# Patient Record
Sex: FEMALE | Race: WHITE
[De-identification: names, ages, dates, MRNs, and addresses within clinical notes are randomized per-mention and may not be internally consistent; named-entity substitution may affect disease eponyms.]

---
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
## 2018-06-01 ENCOUNTER — HOSPITAL ENCOUNTER (EMERGENCY)
Dept: HOSPITAL 25 - UCEAST | Age: 18
Discharge: HOME | End: 2018-06-01
Payer: COMMERCIAL

## 2018-06-01 VITALS — DIASTOLIC BLOOD PRESSURE: 64 MMHG | SYSTOLIC BLOOD PRESSURE: 111 MMHG

## 2018-06-01 DIAGNOSIS — J02.9: Primary | ICD-10-CM

## 2018-06-01 DIAGNOSIS — R09.81: ICD-10-CM

## 2018-06-01 PROCEDURE — G0463 HOSPITAL OUTPT CLINIC VISIT: HCPCS

## 2018-06-01 PROCEDURE — 87651 STREP A DNA AMP PROBE: CPT

## 2018-06-01 PROCEDURE — 99211 OFF/OP EST MAY X REQ PHY/QHP: CPT

## 2018-06-01 NOTE — UC
Yemi GARAY Elizabeth, scribed for Ken Vargas MD on 06/01/18 at 1520 .





Throat Pain/Nasal Reilly HPI





- HPI Summary


HPI Summary: 


This patient is a 18 year old F presenting to [facility] with a chief complaint of 

sore throat since 2 days ago. Symptoms aggravated by nothing. Symptoms 

alleviated by nothing. Patient reports post-nasal drip and nasal congestion. 

Patient denies cough, chest pain, fever, and chills.








- History of Current Complaint


Stated Complaint: SORE THROAT


Time Seen by Provider: 06/01/18 15:11


Hx Obtained From: Patient


Hx Last Menstrual Period: 11/6/17


Onset/Duration: Gradual Onset, Lasting Days - 2 days ago, Still Present


Severity: Mild


Cough: None


Associated Signs & Symptoms: Positive: Nasal Discharge.  Negative: Fever





- Allergies/Home Medications


Allergies/Adverse Reactions: 


 Allergies











Allergy/AdvReac Type Severity Reaction Status Date / Time


 


nickel Allergy  See Comment Verified 06/01/18 15:14














PMH/Surg Hx/FS Hx/Imm Hx


Previously Healthy: Yes





- Surgical History


Surgical History: None





- Family History


Known Family History: Positive: Other - hypothyroidism


   Negative: Cardiac Disease, Hypertension, Diabetes





- Social History


Alcohol Use: Rare


Substance Use Type: None


Smoking Status (MU): Never Smoked Tobacco





- Immunization History


Vaccination Up to Date: Yes





Review of Systems


Constitutional: Negative - NEGATIVE FEVER, NEGATIVE CHILLS


ENT: Sore Throat, Nasal Discharge, Sinus Congestion


Respiratory: Negative - NEGATIVE COUGH


Cardiovascular: Negative - NEGATIVE CHEST PAIN


All Other Systems Reviewed And Are Negative: Yes





Physical Exam





- Summary


Physical Exam Summary: 


VITAL SIGNS: Reviewed.


GENERAL: Patient is a well-developed and nourished FEMALE who is lying 

comfortable in the stretcher. Patient is not in any acute respiratory distress.


HEAD AND FACE: Normocephalic


EYES: PERRLA, EOMI x 2.


EARS: Hearing grossly intact.


MOUTH:. Slight erythematous pharynx


NECK: Supple, trachea is midline, no adenopathy, no JVD, no carotid bruit.


CHEST: Symmetric, no tenderness at palpation


LUNGS: Clear to auscultation bilaterally. No wheezing or crackles.


CVS: Regular rate and rhythm, S1 and S2 present, no murmurs or gallops 

appreciated.


ABDOMEN: Soft, non-tender. Bowel sounds are normal. No abdominal abnormal 

pulsations.


EXTREMITIES: Full ROM in all major joints, no edema, no cyanosis or clubbing.


NEURO: Alert and oriented x 3. No acute neurological deficits. Speech is normal 

and follows commands.


SKIN: Dry and warm





Triage Information Reviewed: Yes


Vital Signs: 


 Initial Vital Signs











Temp  98.6 F   06/01/18 15:11


 


Pulse  76   06/01/18 15:11


 


Resp  18   06/01/18 15:11


 


BP  111/64   06/01/18 15:11


 


Pulse Ox  97   06/01/18 15:11











Vital Signs Reviewed: Yes





Throat Pain/Nasal Course/Dx





- Course


Assessment/Plan: 18-year-old female with chief complaint of sore throat, runny 

nose.  Rapid strep is negative.  Likely the patient has pharyngitis with viral 

etiology.  Patient was discharged home with follow-up with PCP.  She will be 

taken ibuprofen or Tylenol for the pain.  I discussed all the findings and test 

results with the patient and Patient was instructed to return  to the  or go 

to the ED if  develops any fever, increase sore throat unable to swallow, 

drooling, unable to open their mouth, or any other symptoms. The patient 

understands and agrees.  Plan of care was discussed with the patient and 

understands and agrees. All questions were answered at patient satisfaction.  

There were no further complaints or concerns.  Patient is A + O X 3.  

hemodynamically stable.





- Differential Dx/Diagnosis


Differential Diagnosis/HQI/PQRI: Pharyngitis, Sinusitis, Tonsillitis, URI


Provider Diagnoses: Pharyngitis





Discharge





- Sign-Out/Discharge


Documenting (check all that apply): Discharge/Admit/Transfer





- Discharge Plan


Condition: Stable


Disposition: HOME


Patient Education Materials:  Pharyngitis (ED)


Referrals: 


Miya Chen NP [Primary Care Provider] - 


Additional Instructions: 


Take Tylenol or Ibuprofen for fever or pain. 


Increase your fluid intake


Return to the  if symptoms worsen











The documentation as recorded by the Yemi hughes Elizabeth accurately 

reflects the service I personally performed and the decisions made by me, Ken Vargas MD.

## 2018-08-13 ENCOUNTER — HOSPITAL ENCOUNTER (EMERGENCY)
Dept: HOSPITAL 25 - UCEAST | Age: 18
Discharge: HOME | End: 2018-08-13
Payer: COMMERCIAL

## 2018-08-13 VITALS — DIASTOLIC BLOOD PRESSURE: 71 MMHG | SYSTOLIC BLOOD PRESSURE: 121 MMHG

## 2018-08-13 DIAGNOSIS — J03.90: Primary | ICD-10-CM

## 2018-08-13 PROCEDURE — G0463 HOSPITAL OUTPT CLINIC VISIT: HCPCS

## 2018-08-13 PROCEDURE — 87651 STREP A DNA AMP PROBE: CPT

## 2018-08-13 PROCEDURE — 99212 OFFICE O/P EST SF 10 MIN: CPT

## 2018-08-13 NOTE — UC
Throat Pain/Nasal Reilly HPI





- HPI Summary


HPI Summary: 





The pt is a 19 y/o female presenting to  c/o a sore throat since 4 days ago. 

The throat pain described as a n ache is  rated 7/10 in severity  and is  

aggravated by touch. She notes general weakness,  intermittent diaphoresis,  

body aches  and  chills.  The pt denies nausea, dysuria and  abd pain.   





This is scribe Leidy Clinton documenting for attending Dr. Juarez Clark. 

I, Dr. Juarez Clark personally performed the services described in this 

documentation as scribed in my presence and it is both accurate and complete.





- History of Current Complaint


Stated Complaint: SORE THROAT


Time Seen by Provider: 08/13/18 17:44


Hx Obtained From: Patient, Family/Caretaker


Hx Last Menstrual Period: 11/6/17


Onset/Duration: Lasting Days - 4 days, Still Present


Severity: Moderate


Pain Intensity: 7


Pain Scale Used: 0-10 Numeric


Associated Signs & Symptoms: Positive: Other - Positive : notes general weakness

,  intermittent diaphoresis,  body aches  and  chills





- Allergies/Home Medications


Allergies/Adverse Reactions: 


 Allergies











Allergy/AdvReac Type Severity Reaction Status Date / Time


 


nickel Allergy  See Comment Verified 06/01/18 15:14











Home Medications: 


 Home Medications





Norgestimate-Ethinyl Estradiol [Trinessa Tablet] 1 tab PO DAILY 08/13/18 [

History Confirmed 08/13/18]











PMH/Surg Hx/FS Hx/Imm Hx


Previously Healthy: Yes - Denies  PMHx  of HTN, HLD, DM and MI 





- Surgical History


Surgical History: None





- Family History


Known Family History: Positive: Other - hypothyroidism


   Negative: Cardiac Disease, Hypertension, Diabetes





- Social History


Occupation: Employed Full-time


Lives: With Family


Alcohol Use: Rare


Substance Use Type: None


Smoking Status (MU): Never Smoked Tobacco





- Immunization History


Vaccination Up to Date: Yes





Review of Systems


Constitutional: Chills, Other - Positive: General body weakness, body aches


Skin: Other - Positive:Intermitent diaphoresis


ENT: Sore Throat


Gastrointestinal: Negative - Nausea, Abd pain


Genitourinary: Negative - Dysuria


All Other Systems Reviewed And Are Negative: Yes





Physical Exam





- Summary


Physical Exam Summary: 





General: Mildly ill-appearing , no pain distress


Skin: warm, color reflects adequate perfusion, dry


Head: normal


Eyes: EOMI, ABDIFATAH


ENT: Normal TMs, Posterior pharyngitis ;Tonsils 2+ with exudates ; uvula is 

midline; Positive anterior cervical  lymphadenopathy 


Neck: supple, nontender


Respiratory: CTA, breath sounds present


Cardiovascular: RRR


Abdomen: soft, nontender


Bowel: present


Musculoskeletal: normal, strength/ROM intact


Neurological: sensory/motor intact, A&O x3


Psychological: affect/mood appropriate


Triage Information Reviewed: Yes


Vital Signs Reviewed: Yes





Throat Pain/Nasal Course/Dx





- Course


Course Of Treatment: PATIENT HAS HX OF MON AND STATES THIS DOES NOT FEEL LIKE 

MONO AT THIS TIME BUT, WE DISCUSSED THE POSSIBLILITY OF EARLY MONO.  RAPID 

STREP NEGATIVE. WE DISCUSSED VIRAL VERSES BACTERIAL INFECTION. EOWYN WILL START 

THE ZPACK IF NOT IMPROVED.  F/U PMD; RECHECK SOONER IF WORSE.





- Differential Dx/Diagnosis


Provider Diagnoses: tonsillitis





Discharge





- Sign-Out/Discharge


Documenting (check all that apply): Patient Departure





- Discharge Plan


Condition: Stable


Disposition: HOME


Prescriptions: 


Azithromyxin ASCENCION (NF) [Z-Ascencion (Zithromax) 250 mg tabs #6] 2 tab PO .TODAY, THEN 

1 DAILY #6 tab


Patient Education Materials:  Tonsillitis (ED)


Forms:  *Work Release


Referrals: 


Miya Chen NP [Primary Care Provider] - 


Additional Instructions: 


FOLLOW UP WITH YOUR DOCTOR IF NOT COMPLETELY IMPROVED. 


GET RECHECKED FOR ANY WORSENING OF YOUR CONDITION OR QUESTIONS OR CONCERNS.





- Billing Disposition and Condition


Condition: STABLE


Disposition: Home

## 2019-04-18 ENCOUNTER — HOSPITAL ENCOUNTER (EMERGENCY)
Dept: HOSPITAL 25 - UCEAST | Age: 19
Discharge: HOME | End: 2019-04-18
Payer: COMMERCIAL

## 2019-04-18 ENCOUNTER — HOSPITAL ENCOUNTER (EMERGENCY)
Dept: HOSPITAL 25 - ED | Age: 19
Discharge: HOME | End: 2019-04-18
Payer: COMMERCIAL

## 2019-04-18 VITALS — SYSTOLIC BLOOD PRESSURE: 134 MMHG | DIASTOLIC BLOOD PRESSURE: 79 MMHG

## 2019-04-18 VITALS — DIASTOLIC BLOOD PRESSURE: 64 MMHG | SYSTOLIC BLOOD PRESSURE: 112 MMHG

## 2019-04-18 DIAGNOSIS — R06.02: ICD-10-CM

## 2019-04-18 DIAGNOSIS — R07.89: Primary | ICD-10-CM

## 2019-04-18 DIAGNOSIS — H53.9: ICD-10-CM

## 2019-04-18 DIAGNOSIS — Z91.048: ICD-10-CM

## 2019-04-18 DIAGNOSIS — F41.9: Primary | ICD-10-CM

## 2019-04-18 LAB
ALBUMIN SERPL BCG-MCNC: 4.1 G/DL (ref 3.2–5.2)
ALBUMIN/GLOB SERPL: 1.3 {RATIO} (ref 1–3)
ALP SERPL-CCNC: 48 U/L (ref 34–104)
ALT SERPL W P-5'-P-CCNC: 17 U/L (ref 7–52)
ANION GAP SERPL CALC-SCNC: 7 MMOL/L (ref 2–11)
AST SERPL-CCNC: 15 U/L (ref 13–39)
BASOPHILS # BLD AUTO: 0 10^3/UL (ref 0–0.2)
BUN SERPL-MCNC: 11 MG/DL (ref 6–24)
BUN/CREAT SERPL: 15.9 (ref 8–20)
CALCIUM SERPL-MCNC: 9.3 MG/DL (ref 8.6–10.3)
CHLORIDE SERPL-SCNC: 106 MMOL/L (ref 101–111)
EOSINOPHIL # BLD AUTO: 0.6 10^3/UL (ref 0–0.6)
GLOBULIN SER CALC-MCNC: 3.1 G/DL (ref 2–4)
GLUCOSE SERPL-MCNC: 90 MG/DL (ref 70–100)
HCG SERPL QL: < 0.6 MIU/ML
HCO3 SERPL-SCNC: 25 MMOL/L (ref 22–32)
HCT VFR BLD AUTO: 40 % (ref 33–41)
HGB BLD-MCNC: 13.3 G/DL (ref 12–16)
LYMPHOCYTES # BLD AUTO: 3.8 10^3/UL (ref 1–4.8)
MAGNESIUM SERPL-MCNC: 2 MG/DL (ref 1.9–2.7)
MCH RBC QN AUTO: 31 PG (ref 27–31)
MCHC RBC AUTO-ENTMCNC: 34 G/DL (ref 31–36)
MCV RBC AUTO: 93 FL (ref 80–97)
MONOCYTES # BLD AUTO: 0.6 10^3/UL (ref 0–0.8)
NEUTROPHILS # BLD AUTO: 6.2 10^3/UL (ref 1.5–7.7)
NRBC # BLD AUTO: 0 10^3/UL
NRBC BLD QL AUTO: 0.1
PLATELET # BLD AUTO: 273 10^3/UL (ref 150–450)
POTASSIUM SERPL-SCNC: 4.1 MMOL/L (ref 3.5–5)
PROT SERPL-MCNC: 7.2 G/DL (ref 6.4–8.9)
RBC # BLD AUTO: 4.26 10^6 /UL (ref 3.7–4.87)
SODIUM SERPL-SCNC: 138 MMOL/L (ref 135–145)
TROPONIN I SERPL-MCNC: 0 NG/ML (ref ?–0.04)
WBC # BLD AUTO: 11.3 10^3/UL (ref 3.5–10.8)

## 2019-04-18 PROCEDURE — 36415 COLL VENOUS BLD VENIPUNCTURE: CPT

## 2019-04-18 PROCEDURE — 80053 COMPREHEN METABOLIC PANEL: CPT

## 2019-04-18 PROCEDURE — 83735 ASSAY OF MAGNESIUM: CPT

## 2019-04-18 PROCEDURE — G0463 HOSPITAL OUTPT CLINIC VISIT: HCPCS

## 2019-04-18 PROCEDURE — 93005 ELECTROCARDIOGRAM TRACING: CPT

## 2019-04-18 PROCEDURE — 85025 COMPLETE CBC W/AUTO DIFF WBC: CPT

## 2019-04-18 PROCEDURE — 84702 CHORIONIC GONADOTROPIN TEST: CPT

## 2019-04-18 PROCEDURE — 84484 ASSAY OF TROPONIN QUANT: CPT

## 2019-04-18 PROCEDURE — 99212 OFFICE O/P EST SF 10 MIN: CPT

## 2019-04-18 PROCEDURE — 99283 EMERGENCY DEPT VISIT LOW MDM: CPT

## 2019-04-18 NOTE — ED
HPI Chest Pain





- HPI Summary


HPI Summary: 


This patient is a 19 year old F presenting to [facility] with a chief complaint 

sternal CP since a few months ago. The patient rates the pain 6/10 in severity. 

Symptoms aggravated by nothing. Symptoms alleviated by nothing. Patient reports 

HA and blurred vision in right eye since this morning. Patient takes Sertraline 

and triNessa. Patient has hx anxiety and depression. The patient notes she has 

not previously had any of these symptoms.








- History of Current Complaint


Chief Complaint: EDGeneral


Time Seen by Provider: 04/18/19 19:22


Hx Obtained From: Patient


Hx Last Menstrual Period: 3/27/19


Onset/Duration: Started Weeks Ago, Atraumatic, Still Present


Timing: Constant


Initial Severity: Mild


Current Severity: Mild


Pain Intensity: 6


Pain Scale Used: 0-10 Numeric


Chest Pain Location: Diffuse


Chest Pain Radiates: No


Aggravating Factor(s): Nothing


Alleviating Factor(s): Nothing


Associated Signs and Symptoms: Positive: Vision Changes - in right eye





- Allergy/Home Medications


Allergies/Adverse Reactions: 


 Allergies











Allergy/AdvReac Type Severity Reaction Status Date / Time


 


nickel Allergy  See Comment Verified 04/18/19 16:06














PMH/Surg Hx/FS Hx/Imm Hx


Endocrine/Hematology History: 


   Denies: Hx Diabetes, Hx Thyroid Disease


Cardiovascular History: 


   Denies: Hx Congestive Heart Failure, Hx Deep Vein Thrombosis, Hx Hypertension

, Hx Myocardial Infarction, Hx Pacemaker/ICD


Respiratory History: 


   Denies: Hx Asthma, Hx Chronic Obstructive Pulmonary Disease (COPD), Hx Lung 

Cancer, Hx Pneumonia, Hx Pulmonary Embolism


GI History: 


   Denies: Hx Gall Bladder Disease, Hx Gastrointestinal Bleed, Hx Ulcer, Hx 

Urosepsis


 History: 


   Denies: Hx Kidney Stones, Hx Renal Disease


Sensory History: Reports: Hx Contacts or Glasses


Opthamlomology History: Reports: Hx Contacts or Glasses


Neurological History: 


   Denies: Hx Dementia, Hx Migraine, Hx Seizures, Hx Transient Ischemic Attacks 

(TIA)


Psychiatric History: Reports: Hx Anxiety, Hx Depression


   Denies: Hx Schizophrenia, Hx Bipolar Disorder


Infectious Disease History: No


Infectious Disease History: 


   Denies: Hx Hepatitis, Hx Human Immunodeficiency Virus (HIV), History Other 

Infectious Disease, Traveled Outside the [place] in Last 30 Days





- Family History


Known Family History: Positive: Other - hypothyroidism


   Negative: Cardiac Disease, Hypertension, Diabetes





- Social History


Alcohol Use: Rare


Substance Use Type: Reports: None


Smoking Status (MU): Never Smoked Tobacco





Review of Systems


Negative: Fever


Positive: Blurred Vision - in right eye


Positive: Chest Pain


Negative: Cough


Positive: Headache


All Other Systems Reviewed And Are Negative: Yes





Physical Exam





- Summary


Physical Exam Summary: 


VITAL SIGNS: Reviewed.


GENERAL: Patient is a well-developed and nourished FEMALE who is lying 

comfortable in the stretcher. Patient is not in any acute respiratory distress.


HEAD AND FACE: No signs of trauma. No ecchymosis, hematomas or skull 

depressions. No sinus tenderness.


EYES: PERRLA, EOMI x 2, No injected conjunctiva, no nystagmus.


EARS: Hearing grossly intact. Ear canals and tympanic membranes are within 

normal limits.


MOUTH: Oropharynx within normal limits.


NECK: Supple, trachea is midline, no adenopathy, no JVD, no carotid bruit, no c-

spine tenderness, neck with full ROM.


CHEST: Symmetric, no tenderness at palpation


LUNGS: Clear to auscultation bilaterally. No wheezing or crackles.


CVS: Regular rate and rhythm, S1 and S2 present, no murmurs or gallops 

appreciated.


ABDOMEN: Soft, non-tender. No signs of distention. No rebound no guarding, and 

no masses palpated. Bowel sounds are normal.


EXTREMITIES: FROM in all major joints, no edema, no cyanosis or clubbing.


NEURO: Alert and oriented x 3. No acute neurological deficits. Speech is normal 

and follows commands.


SKIN: Dry and warm





Triage Information Reviewed: Yes


Vital Signs On Initial Exam: 


 Initial Vitals











Temp Pulse Resp BP Pulse Ox


 


 98.6 F   101   16   116/91   98 


 


 04/18/19 15:59  04/18/19 15:59  04/18/19 15:59  04/18/19 15:59  04/18/19 15:59











Vital Signs Reviewed: Yes





Diagnostics





- Vital Signs


 Vital Signs











  Temp Pulse Resp BP Pulse Ox


 


 04/18/19 19:53    18  


 


 04/18/19 19:32   85   114/78  99


 


 04/18/19 17:13  98.7 F  89  16  119/84  99


 


 04/18/19 15:59  98.6 F  101  16  116/91  98














- Laboratory


Lab Results: 


 Lab Results











  04/18/19 04/18/19 Range/Units





  19:48 19:48 


 


WBC  11.3 H   (3.5-10.8)  10^3/uL


 


RBC  4.26   (3.70-4.87)  10^6 /uL


 


Hgb  13.3   (12.0-16.0)  g/dL


 


Hct  40   (33-41)  %


 


MCV  93   (80-97)  fL


 


MCH  31   (27-31)  pg


 


MCHC  34   (31-36)  g/dL


 


RDW  13   (10.5-15)  %


 


Plt Count  273   (150-450)  10^3/uL


 


MPV  8.7   (7.4-10.4)  fL


 


Neut % (Auto)  55.0   %


 


Lymph % (Auto)  34.0   %


 


Mono % (Auto)  5.4   %


 


Eos % (Auto)  5.2   %


 


Baso % (Auto)  0.4   %


 


Absolute Neuts (auto)  6.2   (1.5-7.7)  10^3/ul


 


Absolute Lymphs (auto)  3.8   (1.0-4.8)  10^3/ul


 


Absolute Monos (auto)  0.6   (0-0.8)  10^3/ul


 


Absolute Eos (auto)  0.6   (0-0.6)  10^3/ul


 


Absolute Basos (auto)  0   (0-0.2)  10^3/ul


 


Absolute Nucleated RBC  0   10^3/ul


 


Nucleated RBC %  0.1   


 


Sodium   138  (135-145)  mmol/L


 


Potassium   4.1  (3.5-5.0)  mmol/L


 


Chloride   106  (101-111)  mmol/L


 


Carbon Dioxide   25  (22-32)  mmol/L


 


Anion Gap   7  (2-11)  mmol/L


 


BUN   11  (6-24)  mg/dL


 


Creatinine   0.69  (0.51-0.95)  mg/dL


 


Est GFR ( Amer)   130.0  (>60)  


 


Est GFR (Non-Af Amer)   107.4  (>60)  


 


BUN/Creatinine Ratio   15.9  (8-20)  


 


Glucose   90  ()  mg/dL


 


Calcium   9.3  (8.6-10.3)  mg/dL


 


Magnesium   2.0  (1.9-2.7)  mg/dL


 


Total Bilirubin   0.20  (0.2-1.0)  mg/dL


 


AST   15  (13-39)  U/L


 


ALT   17  (7-52)  U/L


 


Alkaline Phosphatase   48  ()  U/L


 


Troponin I   0.00  (<0.04)  ng/mL


 


Total Protein   7.2  (6.4-8.9)  g/dL


 


Albumin   4.1  (3.2-5.2)  g/dL


 


Globulin   3.1  (2-4)  g/dL


 


Albumin/Globulin Ratio   1.3  (1-3)  


 


Beta HCG, Quant   < 0.60  mIU/mL











Result Diagrams: 


 04/18/19 19:48





 04/18/19 19:48


Lab Statement: Any lab studies that have been ordered have been reviewed, and 

results considered in the medical decision making process.





- EKG


  ** 19:51


Cardiac Rate: NL - at 78 bpm


EKG Rhythm: Sinus Rhythm


ST Segment: Normal


Ectopy: None


Summary of EKG Findings: sinus rhythm at 78 bpm with nml axis, nml interval and 

no ischemic changes





Chest Pain Course/Dx





- Course


Course Of Treatment: This patient is a 19 year old F with hx anxiety and 

depression presenting to [facility] with a chief complaint CP since a few months 

ago. Patient reports HA and blurred vision in right eye since this morning. An 

EKG reveals sinus rhythm at 78 bpm with nml axis, nml interval and no ischemic 

changes. Labs and UA obtained. In the ED course the patient was given xanex. 

Patient will be discharged home with and follow up from PCP. Dx anxiety. The 

patient is agreeable with this plan.





- Diagnoses


Provider Diagnoses: 


 Anxiety








Discharge





- Sign-Out/Discharge


Documenting (check all that apply): Patient Departure - discharge home


Patient Received Moderate/Deep Sedation with Procedure: No





- Discharge Plan


Condition: Stable


Disposition: HOME


Patient Education Materials:  Anxiety (ED)


Forms:  *Work Release


Referrals: 


Miya Chen NP [Primary Care Provider] - 1 Day


Additional Instructions: 


Follow up with primary care physician in 1-2 days. Return to the emergency 

department with any new or worsening symptoms.





- Attestation Statements


Document Initiated by Scribe: Yes


Documenting Scribe: Melba Bragg


Provider For Whom Scribe is Documenting (Include Credential): Sherman Wood MD


Scribe Attestation: 


Melba GARAY, scribed for Sherman Wood MD on 04/18/19 at [id number]. 


Status of Scribe Document: Viewed

## 2019-04-18 NOTE — UC
General HPI





- HPI Summary


HPI Summary: 





18 yo female presents with c/o intermittent chest pain x last several months.  

Exact timing unclear.  Today however, chest pain started again, along with 

progressive sob.  No fever / chills.  Pain is midsternal, difficult to 

delineate inciting / alleviating factors.  Denies inspiration hesitation.  No 

recent illness / fever / chills.  No GI issues.  No p/d/w.  Does have hx 

migraine h/a's, had 3 migraine h/a's last week, which is unusual.  Today no h/a 

perse; however, has noted funny visual symptoms R lateral visual field, like 

spots, describes as "blurry."  Did eat today.  No loc / near loc.  Denies 

recent travel.   





- History of Current Complaint


Chief Complaint: UCChestPain


Stated Complaint: CHEST PAIN EYE BLURRY


Time Seen by Provider: 04/18/19 13:54


Hx Obtained From: Patient


Hx Last Menstrual Period: 3/27/19


Pain Intensity: 6





- Allergy/Home Medications


Allergies/Adverse Reactions: 


 Allergies











Allergy/AdvReac Type Severity Reaction Status Date / Time


 


nickel Allergy  See Comment Verified 04/18/19 16:06











Home Medications: 


 Home Medications





Sertraline* [Zoloft*] 50 mg PO DAILY 04/18/19 [History Confirmed 04/18/19]











PMH/Surg Hx/FS Hx/Imm Hx


Previously Healthy: Yes





- Surgical History


Surgical History: None





- Family History


Known Family History: Positive: None - Patient denies any FMHx, Other - 

hypothyroidism


   Negative: Cardiac Disease, Hypertension, Diabetes





- Social History


Alcohol Use: Rare


Substance Use Type: None


Smoking Status (MU): Never Smoked Tobacco





- Immunization History


Vaccination Up to Date: Yes





Review of Systems


All Other Systems Reviewed And Are Negative: Yes


Constitutional: Positive: Negative


Skin: Positive: Negative


Eyes: Positive: Other - see hpi


ENT: Positive: Other - see hpi


Respiratory: Positive: Negative


Cardiovascular: Positive: Negative


Gastrointestinal: Positive: Negative


Genitourinary: Positive: Negative


Motor: Positive: Negative


Neurovascular: Positive: Negative


Musculoskeletal: Positive: Negative


Neurological: Positive: Negative


Psychological: Positive: Negative


Is Patient Immunocompromised?: No





Physical Exam


Triage Information Reviewed: Yes


Appearance: Well-Nourished


Vital Signs: 


 Initial Vital Signs











Temp  98.5 F   04/18/19 13:38


 


Pulse  117   04/18/19 13:38


 


Resp  20   04/18/19 13:38


 


BP  134/79   04/18/19 13:38


 


Pulse Ox  99   04/18/19 13:38











Vital Signs Reviewed: Yes


Eye Exam: Other - notes subjective spots / "blurry vision" R lat vis field.  

PERRLA  EOMI no nystagmus noted nondilated fundiscopic exam nad (but limited d/

t nondilated).


ENT Exam: Normal


ENT: Positive: Pharynx normal


Neck exam: Normal


Neck: Positive: Supple, Nontender, No Lymphadenopathy


Respiratory Exam: Normal


Respiratory: Positive: Chest non-tender, Lungs clear, Normal breath sounds, No 

respiratory distress, No accessory muscle use


Cardiovascular Exam: Normal


Cardiovascular: Positive: RRR, No Murmur, Pulses Normal, Brisk Capillary Refill


Abdominal Exam: Normal


Abdomen Description: Positive: Nontender


Musculoskeletal Exam: Normal - gait steady, moves x 4 ext's


Neurological Exam: Normal - see hpi.  o/w nonfocal.  CN 1 - 12 intact (

including + smell alc swab)


Psychological Exam: Normal - conversing easily and appropriately


Skin Exam: Normal - nondiaphoretic  no visible or reported rash





Course/Dx





- Course


Course Of Treatment: 





Reviewed ekg.  No old ekg for review.  


SR at 99 bpm.  rsr in v1 or v2, right vcd or rvh.  





She does take antidepressants, denies having had an old ekg.  





I discussed with Eowyn condition / sx / and recommendation to go to the Emerg 

Dept for further evaluation and management.  


She carefully considered, and expresses understanding and agreement.  


Declines EMS. 





Questions as posed answered to the best of my ability. 














- Diagnoses


Provider Diagnosis: 


 Chest pain, Shortness of breath, Visual disturbance








Discharge





- Sign-Out/Discharge


Documenting (check all that apply): Patient Departure


All imaging exams completed and their final reports reviewed: No Studies





- Discharge Plan


Condition: Stable


Disposition: HOME


Patient Education Materials:  Chest Pain (ED), Blurred Vision (ED)


Referrals: 


Miya Chen NP [Primary Care Provider] - 


Additional Instructions: 


Please go to the Emergency Department. 


Stop and call [phone] if any problems in the meantime. 











- Billing Disposition and Condition


Condition: STABLE


Disposition: Home

## 2019-06-13 ENCOUNTER — HOSPITAL ENCOUNTER (EMERGENCY)
Dept: HOSPITAL 25 - UCEAST | Age: 19
Discharge: HOME | End: 2019-06-13
Payer: COMMERCIAL

## 2019-06-13 VITALS — SYSTOLIC BLOOD PRESSURE: 121 MMHG | DIASTOLIC BLOOD PRESSURE: 81 MMHG

## 2019-06-13 DIAGNOSIS — F41.9: ICD-10-CM

## 2019-06-13 DIAGNOSIS — K08.89: Primary | ICD-10-CM

## 2019-06-13 DIAGNOSIS — F32.9: ICD-10-CM

## 2019-06-13 DIAGNOSIS — F17.210: ICD-10-CM

## 2019-06-13 PROCEDURE — 99212 OFFICE O/P EST SF 10 MIN: CPT

## 2019-06-13 PROCEDURE — G0463 HOSPITAL OUTPT CLINIC VISIT: HCPCS

## 2019-06-13 NOTE — UC
Dental HPI





- HPI Summary


HPI Summary: 





20 yo female presents with right lower tooth pain for the last 2 days. She has 

been taking ibuprofen 800mg with mild relief. She is eating and drinking 

without difficulty. She does not have a dentist. Denies fever 





- History of Current Complaint


Stated Complaint: DENTAL PAIN


Time Seen by Provider: 06/13/19 16:44


Hx Obtained From: Patient


Hx Last Menstrual Period: 3/27/19


Onset/Duration: Sudden Onset


Severity: Moderate


Pain Intensity: 7


Pain Scale Used: 0-10 Numeric





- Allergies/Home Medications


Allergies/Adverse Reactions: 


 Allergies











Allergy/AdvReac Type Severity Reaction Status Date / Time


 


nickel Allergy  See Comment Verified 06/13/19 16:49














PMH/Surg Hx/FS Hx/Imm Hx


Psychological History: Anxiety, Depression





- Surgical History


Surgical History: None





- Family History


Known Family History: Positive: None - Patient denies any FMHx


   Negative: Cardiac Disease, Hypertension, Diabetes





- Social History


Occupation: Employed Full-time


Lives: With Family


Alcohol Use: Rare


Substance Use Type: None


Smoking Status (MU): Light Every Day Tobacco Smoker


Type: Cigarettes





- Immunization History


Vaccination Up to Date: Yes





Review of Systems


All Other Systems Reviewed And Are Negative: Yes


Constitutional: Positive: Negative


Skin: Positive: Negative


Eyes: Positive: Negative


ENT: Positive: Dental Pain


Respiratory: Positive: Negative


Cardiovascular: Positive: Negative


Neurovascular: Positive: Negative


Neurological: Positive: Negative


Psychological: Positive: Negative





Physical Exam





- Summary


Physical Exam Summary: 





GENERAL: NAD. WDWN. No pain distress.


SKIN: No rashes, sores, lesions, or open wounds.


HEENT:


            Head: AT/NC


            Nose: Nasal mucosa pink and moist. NTTP maxillary and frontal 

sinus. 


            Throat: Posterior oropharynx without exudates, erythema, or 

tonsillar enlargement.  Uvula midline.


NECK: Supple. Nontender. No lymphadenopathy. 


CHEST:  No accessory muscle use. Breathing comfortably and in no distress.


CV:  Pulses intact. Cap refill <2seconds


NEURO: Alert. 


PSYCH: Age appropriate behavior.


Triage Information Reviewed: Yes


Vital Signs: 





Vital Signs:











Temp Pulse Resp BP Pulse Ox


 


 99.3 F   102   17   121/81   96 


 


 06/13/19 16:44  06/13/19 16:44  06/13/19 16:44  06/13/19 16:44  06/13/19 16:44











Vital Signs Reviewed: Yes


Dental: Positive: Cellulitis @ - Outside gum of tooth #32, Other: - tooth #32 

erupting from gum.  Negative: Gross Decay/Caries @, Dental Fracture @, Abscess @

, Cervical Lymphadenopathy, Bleeding





Dental Complaint Course/Dx





- Course


Course Of Treatment: 





Suspect that her discomfort is coming from her tooth #32 wisdom tooth coming 

in. Will refer her to a local dentist and have her try Tylenol #3 for 

discomfort and chlorhexadine mouthwash.





- Differential Dx/Diagnosis


Provider Diagnosis: 


 Toothache








Discharge





- Sign-Out/Discharge


Documenting (check all that apply): Patient Departure


All imaging exams completed and their final reports reviewed: No Studies





- Discharge Plan


Condition: Stable


Disposition: HOME


Prescriptions: 


Acetaminop/Codeine 30 MG TAB* [Tylenol/Codeine 30 MG TAB*] 1 tab PO Q8H PRN #12 

tab MDD 3


 PRN Reason: Pain


Chlorhexidine MW 0.12% 473ML* [Peridex Mouth Wash 0.12%] 15 ml SWISH SPIT BID #

1 btl


Patient Education Materials:  Toothache (ED)


Referrals: 


Miya Chen NP [Primary Care Provider] - 


Additional Instructions: 


There does not appear to be an abscess or infection at your tooth today. I 

suspect your discomfort is coming from your wisdom tooth trying to come in.





Please call a dentist to schedule a follow up for further eval. I recommend Dr. [facility]


Jean Carrington, DMD


[address] [place] [address] [address] [place] [address]





Phone: [phone]





Email:[email or web address]





(may also schedule online at their website [email or web address]





- Billing Disposition and Condition


Condition: STABLE


Disposition: Home

## 2019-11-11 ENCOUNTER — HOSPITAL ENCOUNTER (EMERGENCY)
Dept: HOSPITAL 25 - UCEAST | Age: 19
Discharge: HOME | End: 2019-11-11
Payer: COMMERCIAL

## 2019-11-11 VITALS — SYSTOLIC BLOOD PRESSURE: 136 MMHG | DIASTOLIC BLOOD PRESSURE: 74 MMHG

## 2019-11-11 DIAGNOSIS — K08.89: ICD-10-CM

## 2019-11-11 DIAGNOSIS — K04.7: Primary | ICD-10-CM

## 2019-11-11 DIAGNOSIS — F17.210: ICD-10-CM

## 2019-11-11 PROCEDURE — G0463 HOSPITAL OUTPT CLINIC VISIT: HCPCS

## 2019-11-11 PROCEDURE — 99212 OFFICE O/P EST SF 10 MIN: CPT

## 2019-11-11 NOTE — UC
Dental HPI





- HPI Summary


HPI Summary: 





19 y/p female presents to the urgent care c/o Posterior  RT upper jaw pain at 

the wisdom tooth for the past 6 days.  Pt reports pain is 6/10 associated w/ m 

swelling.  Pt has been taken Ibuprofen 600mg PO to alleviate symptoms.  Pt has 

a dentist and will make an appt for further management. Pt denies fever, HA, 

trimus, dizziness, sore throat, chest pain, abdominal pain, N/V/d.  





- History of Current Complaint


Stated Complaint: DENTAL PAIN


Time Seen by Provider: 11/11/19 19:01


Hx Obtained From: Patient


Hx Last Menstrual Period: 3/27/19


Pregnant?: No


Onset/Duration: Gradual Onset, Lasting Days - 6 days, Still Present, Worse 

Since - today


Severity: Moderate


Pain Intensity: 6


Pain Scale Used: 0-10 Numeric


Aggravating Factor(s): Chewing


Alleviating Factor(s): OTC Meds - Ibuprofen PO 600mg last dose taken today 

around 11Am





- Allergies/Home Medications


Allergies/Adverse Reactions: 


 Allergies











Allergy/AdvReac Type Severity Reaction Status Date / Time


 


nickel Allergy  See Comment Verified 11/11/19 19:04














PMH/Surg Hx/FS Hx/Imm Hx


Previously Healthy: Yes - Pt denies PMHX





- Surgical History


Surgical History: None





- Family History


Known Family History: Positive: None - Patient denies any FMHx, Other - 

hypothyroidism


   Negative: Cardiac Disease, Hypertension, Diabetes





- Social History


Occupation: Employed Full-time


Lives: With Family


Alcohol Use: Rare


Substance Use Type: None


Smoking Status (MU): Light Every Day Tobacco Smoker


Type: Cigarettes





- Immunization History


Vaccination Up to Date: Yes





Review of Systems


All Other Systems Reviewed And Are Negative: Yes


Constitutional: Positive: Negative


Skin: Positive: Negative


Eyes: Positive: Negative


ENT: Positive: Dental Pain - RT upper posterior jaw dental pain w/ mild swelling


Respiratory: Positive: Negative


Cardiovascular: Positive: Negative


Gastrointestinal: Positive: Negative


Genitourinary: Positive: Negative


Motor: Positive: Negative


Neurovascular: Positive: Negative


Musculoskeletal: Positive: Negative


Neurological: Positive: Negative


Psychological: Positive: Negative


Is Patient Immunocompromised?: No





Physical Exam





- Summary


Physical Exam Summary: 





Vital Signs Reviewed: Yes


General: Well-Appearing,  Well-Nourished female sitting in the examining table w

/o any respiratory or pain distress


Eyes: Positive: Conjunctiva Clear - PERRLA, EOMI,


ENT: Positive: Normal ENT inspection, Hearing grossly normal, Pharynx normal, 

TMs normal - B/L external ear canals clear,. Negative: Tonsillar swelling, 

Tonsillar exudate, Trismus


Dental: Positive: RT upper posterior upper maxillary w/  molar #1 w/ gingival 

swelling and erythema, tender to percussion. w/ positive anterior Cervical 

Lymphadenopathy. 


Neck: Positive: Supple


Respiratory: Positive: Chest non-tender, Lungs clear, Normal breath sounds, No 

respiratory distress


Cardiovascular: Positive: RRR, No Murmur, Pulses Normal, Brisk Capillary Refill


Abdomen Description: Positive: Nontender, No Organomegaly, Soft. Negative: CVA 

Tenderness (R), CVA Tenderness (L)


Bowel Sounds: Positive: Present


Musculoskeletal: Positive: Strength Intact, ROM Intact, No Edema


Neurological Exam: Normal


Psychological Exam: Normal


Skin Exam: Normal








Triage Information Reviewed: Yes





Dental Complaint Course/Dx





- Course


Course Of Treatment: 


  19 y/p female presents to the urgent care c/o Posterior  RT upper jaw pain at 

the wisdom tooth for the past 6 days.  Pt reports pain is 6/10 associated w/ m 

swelling.  Pt has been taken Ibuprofen 600mg PO to alleviate symptoms.  Pt has 

a dentist and will make an appt for further management. Pt denies fever, HA, 

trimus, dizziness, sore throat, chest pain, abdominal pain, N/V/d.  Hx 

obtained. PT w/ RT upper posterior upper maxillary  molar #1 w/ gingival 

swelling and mild erythema, tender to percussion. w/ positive anterior Cervical 

Lymphadenopathy.   Pt with possible dental abscess.  Pt Rx Amoxicillin PO  and 

advised to continue Ibuprofen PO for pain. Pt strongly advised to f/u with 

Dentist as soon as possible for further evaluation and treatment. Pt understood 

and agreed with plan of care. Left the clinic ambulating. 








- Differential Dx/Diagnosis


Differential Diagnosis/Dx: Dental Abscess, Dental Caries, Odontogenic Pain


Provider Diagnosis: 


 Pain, dental, Dental abscess








Discharge ED





- Sign-Out/Discharge


Documenting (check all that apply): Patient Departure - D/c home


All imaging exams completed and their final reports reviewed: No Studies





- Discharge Plan


Condition: Stable


Disposition: HOME


Prescriptions: 


Amoxicillin PO (*) [Amoxicillin 500 MG CAP*] 500 mg PO TID #30 cap


Patient Education Materials:  Dental Abscess (ED)


Referrals: 


Miya Chen NP [Primary Care Provider] - 


Additional Instructions: 


1-Please take full course of antibiotics to avoid resistance. Take yogurt w/ 

probiotics or culturelle to protect your GI system


2- Continue taking Ibuprofen PO q6-8hrs prn after meals  to alleviate pain and 

swelling. 


3- F/u with your Dentist as soon as possible for further treatment.


4- If symptoms do not improve or worsen please return to the urgent care or f/u 

with your PCP in 3 days for further evaluation and treatment








- Billing Disposition and Condition


Condition: STABLE


Disposition: Home





- Attestation Statements


Provider Attestation: 





This patient was not seen by me


I was available for consult


Chart reviewed


ELVIN

## 2020-02-04 ENCOUNTER — HOSPITAL ENCOUNTER (EMERGENCY)
Dept: HOSPITAL 25 - UCEAST | Age: 20
Discharge: LEFT BEFORE DISCHARGE | End: 2020-02-04
Payer: COMMERCIAL

## 2020-02-04 DIAGNOSIS — Z53.21: Primary | ICD-10-CM
